# Patient Record
Sex: FEMALE | Race: WHITE | Employment: FULL TIME | URBAN - METROPOLITAN AREA
[De-identification: names, ages, dates, MRNs, and addresses within clinical notes are randomized per-mention and may not be internally consistent; named-entity substitution may affect disease eponyms.]

---

## 2021-04-02 ENCOUNTER — TRANSCRIBE ORDER (OUTPATIENT)
Dept: GENERAL RADIOLOGY | Age: 51
End: 2021-04-02

## 2021-04-02 ENCOUNTER — HOSPITAL ENCOUNTER (OUTPATIENT)
Dept: GENERAL RADIOLOGY | Age: 51
Discharge: HOME | End: 2021-04-02
Attending: NURSE PRACTITIONER
Payer: COMMERCIAL

## 2021-04-02 DIAGNOSIS — M79.671 RIGHT FOOT PAIN: ICD-10-CM

## 2021-04-02 DIAGNOSIS — M25.531 RIGHT WRIST PAIN: Primary | ICD-10-CM

## 2021-04-02 DIAGNOSIS — M25.531 RIGHT WRIST PAIN: ICD-10-CM

## 2021-04-02 PROCEDURE — 73630 X-RAY EXAM OF FOOT: CPT

## 2021-04-02 PROCEDURE — 73110 X-RAY EXAM OF WRIST: CPT

## 2024-10-23 ENCOUNTER — TELEPHONE (OUTPATIENT)
Age: 54
End: 2024-10-23

## 2024-10-23 ENCOUNTER — OFFICE VISIT (OUTPATIENT)
Age: 54
End: 2024-10-23

## 2024-10-23 VITALS
OXYGEN SATURATION: 99 % | SYSTOLIC BLOOD PRESSURE: 138 MMHG | BODY MASS INDEX: 25.73 KG/M2 | DIASTOLIC BLOOD PRESSURE: 96 MMHG | WEIGHT: 145.2 LBS | RESPIRATION RATE: 16 BRPM | HEIGHT: 63 IN | TEMPERATURE: 98.7 F | HEART RATE: 94 BPM

## 2024-10-23 DIAGNOSIS — F41.9 ANXIETY: Primary | ICD-10-CM

## 2024-10-23 DIAGNOSIS — R00.2 HEART PALPITATIONS: ICD-10-CM

## 2024-10-23 RX ORDER — ESTRADIOL 1 MG/1
1 TABLET ORAL DAILY
COMMUNITY

## 2024-10-23 RX ORDER — HYDROXYZINE HYDROCHLORIDE 25 MG/1
25 TABLET, FILM COATED ORAL EVERY 8 HOURS PRN
Qty: 30 TABLET | Refills: 0 | Status: SHIPPED | OUTPATIENT
Start: 2024-10-23 | End: 2024-11-02

## 2024-10-23 NOTE — PROGRESS NOTES
Apurva Hodges (:  1970) is a 53 y.o. female,New patient, here for evaluation of the following chief complaint(s):  Panic Attack (Pt states last night she felt like she had a panic attack, has a lot of stress going on in personal life, felt like heart was racing, tingling in the arms and hands, felt nauseous, crying, feeling overwhelmed, pt states she never had one before, couldn't sleep, woke up feeling restless and unsettled)      ASSESSMENT/PLAN:  Visit Diagnoses and Associated Orders       Anxiety    -  Primary    [facility] - Amaya Wang MD, Psychiatry, [place] [[id number] Custom]      hydrOXYzine HCl (ATARAX) 25 MG tablet [3774]           Heart palpitations        [facility][id number]           ORDERS WITHOUT AN ASSOCIATED DIAGNOSIS    estradiol (ESTRACE) 1 MG tablet [9967]               Please follow up with cardiology as ordered.. Referral given.  Please follow up with Internal medicine. Referral given.  Please take hydroxyzine as ordered for anxiety. Referral to psychiatry given.   Please keep a blood pressure log.  Follow up in 7 days if symptoms persist or if symptoms worsen.    SUBJECTIVE/OBJECTIVE:    History provided by:  Patient   used: No         53 y.o. female presents with symptoms of   elevated blood pressure with concurrent panic attack, and anxiety.  She denies SI, HI.     Patient was more calm and reassurance given during visit. Stable to leave clinic and continue with OP assistance. Appropriate referrals given.    Vitals:    10/23/24 1133 10/23/24 1204   BP: (!) 181/111 (!) 138/96   Site: Right Upper Arm Left Upper Arm   Position: Sitting Sitting   Cuff Size: Medium Adult Medium Adult   Pulse: 94    Resp: 16    Temp: 98.7 °F (37.1 °C)    TempSrc: Oral    SpO2: 99%    Weight: 65.9 kg (145 lb 3.2 oz)    Height: 1.6 m (5' 3\")            Physical Exam  Vitals and nursing note reviewed.   Constitutional:       Appearance: Normal

## 2024-10-31 ENCOUNTER — TELEPHONE (OUTPATIENT)
Age: 54
End: 2024-10-31

## 2024-10-31 NOTE — TELEPHONE ENCOUNTER
Called pt @ [phone] to set up an appt due to the referral received. No answer; left vm to return call.

## 2024-11-06 ENCOUNTER — OFFICE VISIT (OUTPATIENT)
Age: 54
End: 2024-11-06
Payer: COMMERCIAL

## 2024-11-06 ENCOUNTER — TELEPHONE (OUTPATIENT)
Age: 54
End: 2024-11-06

## 2024-11-06 VITALS
HEART RATE: 76 BPM | SYSTOLIC BLOOD PRESSURE: 128 MMHG | OXYGEN SATURATION: 98 % | BODY MASS INDEX: 25.98 KG/M2 | DIASTOLIC BLOOD PRESSURE: 72 MMHG | HEIGHT: 63 IN | WEIGHT: 146.6 LBS

## 2024-11-06 DIAGNOSIS — R00.2 PALPITATIONS: Primary | ICD-10-CM

## 2024-11-06 DIAGNOSIS — R00.2 PALPITATIONS: ICD-10-CM

## 2024-11-06 PROCEDURE — 93000 ELECTROCARDIOGRAM COMPLETE: CPT | Performed by: INTERNAL MEDICINE

## 2024-11-06 PROCEDURE — 99204 OFFICE O/P NEW MOD 45 MIN: CPT | Performed by: INTERNAL MEDICINE

## 2024-11-06 RX ORDER — HYDROXYZINE HYDROCHLORIDE 25 MG/1
25 TABLET, FILM COATED ORAL 3 TIMES DAILY PRN
COMMUNITY

## 2024-11-06 ASSESSMENT — PATIENT HEALTH QUESTIONNAIRE - PHQ9
SUM OF ALL RESPONSES TO PHQ QUESTIONS 1-9: 0
SUM OF ALL RESPONSES TO PHQ9 QUESTIONS 1 & 2: 0
2. FEELING DOWN, DEPRESSED OR HOPELESS: NOT AT ALL
SUM OF ALL RESPONSES TO PHQ QUESTIONS 1-9: 0
SUM OF ALL RESPONSES TO PHQ QUESTIONS 1-9: 0
1. LITTLE INTEREST OR PLEASURE IN DOING THINGS: NOT AT ALL
SUM OF ALL RESPONSES TO PHQ QUESTIONS 1-9: 0

## 2024-11-06 NOTE — TELEPHONE ENCOUNTER
Enrolled with [employer] - Ordered and being shipped to patient's home address on file.  ETA within 5-7 business days.         holter  Received: Today  Penny Ybarra, RN  Sabra Coffman! Dr. Gregory wants this pt to have a 14d holter monitor for palps

## 2024-11-06 NOTE — PROGRESS NOTES
results found for: \"TRIG\"  No results found for: \"HDL\"  No components found for: \"LDLCHOLESTEROL\", \"LDLCALC\"  No results found for: \"VLDL\"  No results found for: \"CHOLHDLRATIO\"          Investigations reviewed  No results found for this or any previous visit.    No results found for this or any previous visit.     ATTENTION:   This medical record was transcribed using an electronic medical records/speech recognition system.  Although proofread, it may and can contain electronic, spelling and other errors.  Corrections may be executed at a later time.  Please feel free to contact us for any clarifications as needed.    Jerson [facility]  [facility], [facility],  [place], [place]. [phone]

## 2024-11-07 LAB — TSH SERPL DL<=0.005 MIU/L-ACNC: 1.91 UIU/ML (ref 0.45–4.5)

## 2025-05-30 ENCOUNTER — TELEPHONE (OUTPATIENT)
Age: 55
End: 2025-05-30

## 2025-05-30 ENCOUNTER — OFFICE VISIT (OUTPATIENT)
Age: 55
End: 2025-05-30
Payer: COMMERCIAL

## 2025-05-30 VITALS
HEIGHT: 63 IN | BODY MASS INDEX: 26.75 KG/M2 | SYSTOLIC BLOOD PRESSURE: 134 MMHG | DIASTOLIC BLOOD PRESSURE: 84 MMHG | WEIGHT: 151 LBS | OXYGEN SATURATION: 98 % | HEART RATE: 82 BPM

## 2025-05-30 DIAGNOSIS — R00.2 PALPITATIONS: Primary | ICD-10-CM

## 2025-05-30 PROCEDURE — 93000 ELECTROCARDIOGRAM COMPLETE: CPT | Performed by: INTERNAL MEDICINE

## 2025-05-30 PROCEDURE — 99214 OFFICE O/P EST MOD 30 MIN: CPT | Performed by: INTERNAL MEDICINE

## 2025-05-30 NOTE — TELEPHONE ENCOUNTER
Spoke with Dr. Perdue's office. Patient has not been seen since 2021. Left VM for patient to provide an updated PCP.

## 2025-05-30 NOTE — PROGRESS NOTES
No chief complaint on file.    Vitals:    05/30/25 1525   BP: 134/84   BP Site: Left Upper Arm   Patient Position: Sitting   Pulse: 82   SpO2: 98%   Weight: 68.5 kg (151 lb)   Height: 1.6 m (5' 3\")         Chest pain: DENIED     Recent hospital stays: DENIED     Refills: DENIED   
occurred about 2 weeks ago when she had to go to the emergency room.  Palpitations were in the form of heart racing with associated anxiety, no dizziness or shortness of breath or passing out spells.  Had some tightness in the chest on the left side but was predominantly superficial.  No similar symptoms in the past.  Reports that she had possible flulike illness about 2 to 3 weeks prior.  No COVID symptoms.  No history of history of thyroid disease.    No past medical history on file.  Social History       Tobacco History       Smoking Status  Never      Smokeless Tobacco Use  Never              Alcohol History       Alcohol Use Status  Never              Drug Use       Drug Use Status  Never              Sexual Activity       Sexually Active  Not Asked                     Review of system:Patient reports no dyspnea/PND/Orthpnea/CP. She reports no cough/fever/focal neurological deficits/abdominal pain.All other systems negative except as above.     Physical Exam  Vitals:    05/30/25 1525   BP: 134/84   BP Site: Left Upper Arm   Patient Position: Sitting   Pulse: 82   SpO2: 98%   Weight: 68.5 kg (151 lb)   Height: 1.6 m (5' 3\")      General:    Alert, cooperative, no distress.   Psychiatric:    Normal Mood and affect    Eye/ENT:      Pupils equal, No asymmetry, Conjunctival pink. Able to hear voice at normal amplitude   Lungs:      Visibly symmetric chest expansion, No palpable tenderness. Clear to auscultation bilaterally.    Heart::    Regular rate and rhythm, S1, S2 normal, No murmur. No click, rub or gallop.No JVD, Normal palpable peripheral pulses. No cyanosis   Abdomen:     Soft, non-tender. Bowel sounds normal. No masses,  No      organomegaly.   Extremities:   Extremities normal, atraumatic, minimal edema.   Neurologic:   CN II-XII grossly intact. No gross focal deficits               Recent Labs:  No results found for: \"NA\", \"K\", \"CL\", \"CO2\", \"BUN\", \"CREATININE\", \"GLUCOSE\", \"CALCIUM\", \"LABGLOM\"   No results